# Patient Record
(demographics unavailable — no encounter records)

---

## 2024-10-09 NOTE — HISTORY OF PRESENT ILLNESS
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  Patient received his current and first prosthesis in July, 2024.  Patient's main complaint is that the prosthetic socket is significantly too large causing instability during ambulation and episodic rotation of the prosthesis.  No skin breakdown.  Patient gets mild discomfort towards the distal tibia during ambulation.  No falls reported, no phantom pain.  Patient is currently wearing 2 prosthetic socks but sometimes will wear 3 prosthetic socks.   added padding to the socket.  Functionally the patient is an independent community ambulator without an assistive device.  Patient is independent all transfers activities of daily living.  Patient can negotiate all environmental barriers such as curbs, ramps and stairs.  Patient is living with his wife and children in a third floor walk up apartment.  Patient works as a  in a construction company..

## 2024-10-09 NOTE — PHYSICAL EXAM
[FreeTextEntry1] : General: Well-developed male in no apparent distress.  Patient is awake, alert and oriented x 3.  Cooperative. HEENT: Normocephalic, atraumatic.  MMM Extremities: No pedal edema on the left.  Right BKA: Cylindrical shape with distal edema noted.  Surgical wound is well-healed, no skin breakdown, no erythema. No tenderness to palpation.  No skin adhesion.  Full active range of motion at the knee.  Motor: Both upper extremities: Tone normal, active range of motion within functional limits with 5/5 motor power throughout.  Thumb to digit opposition intact bilaterally Both lower extremities: Tone normal, active range of motion within functional limits with 5/5 motor power throughout.  Sensory: Intact to light touch in both lower extremities  Functional status: Sit to stand transfer independent.  Patient ambulates in the office independently without an assistive device with a right BKA prosthesis with a silicone locking liner, 9 ply prosthetic socks.  Lateral thrusting of the knee noted.  Patient is a K3 ambulator.

## 2024-10-09 NOTE — ASSESSMENT
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  Patient requires a BK socket replacement to improve prosthetic fit, comfort and for safety of ambulation.  In addition the patient's silicone liners are stretched and worn, prosthetic socks are frayed and require replacement.   Patient is a K3 ambulator.  Prescription provided for a right custom molded BK socket replacement with a total contact acrylic socket, flexible inner socket, test socket, silicone locking liner with locking mechanism, ultralight alignable components, outer protective cover, 6 multiple ply socks, 6 single ply socks.    I spent a total of 20 minutes on the date of the encounter evaluating and treating the patient including discussion of treatment options.

## 2025-03-10 NOTE — PHYSICAL EXAM
[FreeTextEntry1] : General: Well-developed male in no apparent distress.  Patient is awake, alert and oriented x 3.  Cooperative. HEENT: Normocephalic, atraumatic.  MMM Extremities: No pedal edema on the left.  Right BKA: Cylindrical shape with mild distal edema.  Slight hyperpigmentation in RLE patella. Surgical wound is well-healed, no skin breakdown, no erythema. No tenderness to palpation.  No skin adhesion.  Full active range of motion at the knee.  Motor: Both upper extremities: Tone normal, active range of motion within functional limits with 5/5 motor power throughout.   Both lower extremities: Tone normal, active range of motion within functional limits with 5/5 motor power throughout.  Sensory: Intact to light touch in both lower extremities  Functional status: Sit to stand transfer independent.  Patient ambulates in the office independently without an assistive device with a right BKA prosthesis with a silicone locking liner, 8 ply prosthetic socks.  Mild lateral thrusting of the knee noted.  Patient is a K3 ambulator.

## 2025-03-10 NOTE — ASSESSMENT
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  Patient requires a BK socket replacement to improve prosthetic fit, comfort and for safety of ambulation.  Patient has gained approximately 40 pounds since receiving his prosthesis and a K3 shock foot would help reduce shear forces at his hip, back and residual limb.  In addition the patient's silicone liners are stretched and worn, prosthetic socks are frayed and require replacement. Patient is a K3 ambulator.  Prescription provided for a right custom molded endoskeletal BK prosthesis with a total contact acrylic socket, flexible inner socket, test socket, silicone locking liner with locking mechanism, ultralight alignable components, K3 shock foot, outer protective cover, 6 multiple ply and 6 single ply socks.   I spent a total of 20 minutes on the date of the encounter evaluating and treating the patient including a discussion of treatment options.

## 2025-03-10 NOTE — HISTORY OF PRESENT ILLNESS
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  Patient denies pain during ambulation, falls or skin breakdown.  Patient is currently wearing 8 ply prosthetic socks with posterior padding in the socket.  Functionally the patient is an independent community ambulator without an assistive device.  Patient is independent all transfers activities of daily living.  Patient can negotiate all environmental barriers such as curbs, ramps and stairs.  Patient is living with his wife and children in a third floor walk up apartment.  Patient works as a  in a construction company.